# Patient Record
(demographics unavailable — no encounter records)

---

## 2024-11-14 NOTE — ADDENDUM
[FreeTextEntry1] : Next Visit: Uroflow, PVR, PSA/4K  Entered by Tonja Kang, acting as scribe and chaperone for Dr. Jaret Vazquez.   The documentation recorded by the scribe accurately reflects the service I personally performed and the decisions made by me.

## 2024-11-14 NOTE — PHYSICAL EXAM
[Normal Appearance] : normal appearance [Well Groomed] : well groomed [General Appearance - In No Acute Distress] : no acute distress [Edema] : no peripheral edema [Respiration, Rhythm And Depth] : normal respiratory rhythm and effort [Exaggerated Use Of Accessory Muscles For Inspiration] : no accessory muscle use [Abdomen Soft] : soft [Abdomen Tenderness] : non-tender [Costovertebral Angle Tenderness] : no ~M costovertebral angle tenderness [Urinary Bladder Findings] : the bladder was normal on palpation [Prostate Size ___ (0-4)] : prostate size [unfilled] (scale: 0-4) [Normal Station and Gait] : the gait and station were normal for the patient's age [] : no rash [No Focal Deficits] : no focal deficits [Oriented To Time, Place, And Person] : oriented to person, place, and time [Affect] : the affect was normal [Mood] : the mood was normal [No Palpable Adenopathy] : no palpable adenopathy [Urethral Meatus] : meatus normal [Penis Abnormality] : normal circumcised penis [Scrotum] : the scrotum was normal [Prostate Tenderness] : the prostate was not tender [No Prostate Nodules] : no prostate nodules

## 2024-11-14 NOTE — ASSESSMENT
[FreeTextEntry1] : Patient is stable clinically; he is voiding adequately with good bladder emptying. He will continue with alfuzosin, finasteride and tadalafil daily, and sildenafil as needed. If all remains stable, he will follow-up in 6 months.

## 2024-11-14 NOTE — HISTORY OF PRESENT ILLNESS
[FreeTextEntry1] : Patient comes in with no new voiding complaints. He is voiding with an adequate stream, nocturia x 1, no dysuria or hematuria. Patient continues to take alfuzosin and finasteride daily. He takes sildenafil as needed with adequate erections. He has not been taking daily tadalafil due to issues with his pharmacy. PSA on 5/9/24 was 5.04 ng/mL (10.08ng/ml - corrected for finasteride)

## 2024-11-14 NOTE — LETTER BODY
[Dear  ___] : Dear  [unfilled], [Courtesy Letter:] : I had the pleasure of seeing your patient, [unfilled], in my office today. [Please see my note below.] : Please see my note below. [Consult Closing:] : Thank you very much for allowing me to participate in the care of this patient.  If you have any questions, please do not hesitate to contact me. [Sincerely,] : Sincerely, [FreeTextEntry3] : Jaret

## 2025-05-15 NOTE — HISTORY OF PRESENT ILLNESS
[FreeTextEntry1] : Patient comes in with no new voiding complaints. He reported an episode of gross hematuria that lasted a few days in February that recurred after 1 week; this has since resolved. Today he is voiding with an adequate stream, nocturia x 1, no dysuria or hematuria. Patient continues to take alfuzosin and finasteride daily; he takes daily tadalafil with sildenafil 100mg as needed with inability to maintain adequate erections. He reports bothersome penile curvature with erections.

## 2025-05-15 NOTE — ADDENDUM
[FreeTextEntry1] : Next Visit: Uroflow, PVR, ADRIANA, poss. alprostadil trial  Entered by Tonja Kang, acting as scribe and chaperone for Dr. Jaret Vazquez.   The documentation recorded by the scribe accurately reflects the service I personally performed and the decisions made by me.

## 2025-05-15 NOTE — ASSESSMENT
[FreeTextEntry1] : Patient is stable clinically; he is voiding adequately with moderate post void residual. As he has decreased clinical response to PDE 5 inhibitors we discussed other options with intracavernosal injections, vacuum erectile device, or penile implant placement. We discussed options for treatment of his Peyronie's disease with surgical intervention; we discussed that a penile implant will also resolve his curvature. He will let us know if and when he is interested in an alprostadil trial. He will continue with alfuzosin, finasteride and tadalafil daily, and sildenafil as needed. His serum was sent for PSA/4K determination. If his labs are unremarkable, he will follow-up in 6 months.